# Patient Record
Sex: MALE | Race: WHITE | URBAN - METROPOLITAN AREA
[De-identification: names, ages, dates, MRNs, and addresses within clinical notes are randomized per-mention and may not be internally consistent; named-entity substitution may affect disease eponyms.]

---

## 2021-11-17 ENCOUNTER — OFFICE VISIT (OUTPATIENT)
Dept: PRIMARY CARE CLINIC | Age: 71
End: 2021-11-17
Payer: MEDICARE

## 2021-11-17 VITALS — OXYGEN SATURATION: 96 % | TEMPERATURE: 98.7 F | HEART RATE: 66 BPM

## 2021-11-17 DIAGNOSIS — R53.83 FATIGUE, UNSPECIFIED TYPE: ICD-10-CM

## 2021-11-17 DIAGNOSIS — R09.81 HEAD CONGESTION: ICD-10-CM

## 2021-11-17 DIAGNOSIS — R51.9 ACUTE NONINTRACTABLE HEADACHE, UNSPECIFIED HEADACHE TYPE: Primary | ICD-10-CM

## 2021-11-17 PROCEDURE — 99202 OFFICE O/P NEW SF 15 MIN: CPT | Performed by: NURSE PRACTITIONER

## 2021-11-17 PROCEDURE — 1123F ACP DISCUSS/DSCN MKR DOCD: CPT | Performed by: NURSE PRACTITIONER

## 2021-11-17 PROCEDURE — G8484 FLU IMMUNIZE NO ADMIN: HCPCS | Performed by: NURSE PRACTITIONER

## 2021-11-17 PROCEDURE — 4004F PT TOBACCO SCREEN RCVD TLK: CPT | Performed by: NURSE PRACTITIONER

## 2021-11-17 PROCEDURE — G8428 CUR MEDS NOT DOCUMENT: HCPCS | Performed by: NURSE PRACTITIONER

## 2021-11-17 PROCEDURE — G8421 BMI NOT CALCULATED: HCPCS | Performed by: NURSE PRACTITIONER

## 2021-11-17 PROCEDURE — 4040F PNEUMOC VAC/ADMIN/RCVD: CPT | Performed by: NURSE PRACTITIONER

## 2021-11-17 PROCEDURE — 3017F COLORECTAL CA SCREEN DOC REV: CPT | Performed by: NURSE PRACTITIONER

## 2021-11-17 ASSESSMENT — ENCOUNTER SYMPTOMS
VOMITING: 0
NAUSEA: 0
SHORTNESS OF BREATH: 0
ABDOMINAL PAIN: 0
DIARRHEA: 0
EYE REDNESS: 0
COUGH: 0
SORE THROAT: 0

## 2021-11-17 NOTE — PROGRESS NOTES
Panchito Campos (:  1950) is a 80 y.o. male,New patient, here for evaluation of the following chief complaint(s):  Covid Testing, Headache, Head Congestion, and Fatigue    Patient seen car side. ASSESSMENT/PLAN:  1. Acute nonintractable headache, unspecified headache type  -     COVID-19  2. Head congestion  -     COVID-19  3. Fatigue, unspecified type  -     COVID-19      No follow-ups on file. Subjective   SUBJECTIVE/OBJECTIVE:  Exposure to covid on 22  Booster covid vaccine yesterday  Symptoms began last night       Review of Systems   Constitutional: Positive for activity change and fatigue. Negative for appetite change, chills and diaphoresis. HENT: Positive for congestion. Negative for sore throat. Eyes: Negative for redness. Respiratory: Negative for cough and shortness of breath. Gastrointestinal: Negative for abdominal pain, diarrhea, nausea and vomiting. Neurological: Positive for headaches. Objective   Physical Exam  Vitals reviewed. Constitutional:       General: He is not in acute distress. Appearance: Normal appearance. He is not ill-appearing. HENT:      Head: Normocephalic. Right Ear: External ear normal.      Left Ear: External ear normal.      Nose: Congestion present. Eyes:      Extraocular Movements: Extraocular movements intact. Conjunctiva/sclera: Conjunctivae normal.      Pupils: Pupils are equal, round, and reactive to light. Cardiovascular:      Rate and Rhythm: Normal rate. Pulses: Normal pulses. Pulmonary:      Effort: Pulmonary effort is normal.   Musculoskeletal:         General: Normal range of motion. Cervical back: Normal range of motion. Neurological:      General: No focal deficit present. Mental Status: He is alert and oriented to person, place, and time. An electronic signature was used to authenticate this note.     --Payam Eagle, RAINA - CNP

## 2021-11-17 NOTE — PROGRESS NOTES
Patient came in for curb side COVID test at our office. The patient has had Sx for two days. Their Sx are headache, head michael, fatigue. I had my gown, N95, shield and gloves on. Vitals were taken and placed in their chart. Patient told to isolate until results were called to them. All COVID precautions were taken.

## 2021-11-18 LAB — SARS-COV-2: NOT DETECTED
